# Patient Record
Sex: FEMALE | Race: WHITE | Employment: FULL TIME | URBAN - METROPOLITAN AREA
[De-identification: names, ages, dates, MRNs, and addresses within clinical notes are randomized per-mention and may not be internally consistent; named-entity substitution may affect disease eponyms.]

---

## 2024-08-27 ENCOUNTER — HOSPITAL ENCOUNTER (EMERGENCY)
Age: 29
Discharge: HOME | End: 2024-08-27
Attending: STUDENT IN AN ORGANIZED HEALTH CARE EDUCATION/TRAINING PROGRAM

## 2024-08-27 VITALS
BODY MASS INDEX: 31.05 KG/M2 | SYSTOLIC BLOOD PRESSURE: 116 MMHG | DIASTOLIC BLOOD PRESSURE: 72 MMHG | HEART RATE: 100 BPM | HEIGHT: 64 IN | RESPIRATION RATE: 18 BRPM | TEMPERATURE: 98.6 F | WEIGHT: 181.88 LBS | OXYGEN SATURATION: 98 %

## 2024-08-27 DIAGNOSIS — R10.30 LOWER ABDOMINAL PAIN: Primary | ICD-10-CM

## 2024-08-27 LAB
ALBUMIN SERPL-MCNC: 4.2 G/DL (ref 3.4–5)
ALBUMIN/GLOB SERPL: 1.2 {RATIO} (ref 1.1–2.2)
ALP SERPL-CCNC: 100 U/L (ref 40–129)
ALT SERPL-CCNC: 23 U/L (ref 10–40)
ANION GAP SERPL CALCULATED.3IONS-SCNC: 11 MMOL/L (ref 3–16)
AST SERPL-CCNC: 23 U/L (ref 15–37)
BASOPHILS # BLD: 0 K/UL (ref 0–0.2)
BASOPHILS NFR BLD: 0.4 %
BILIRUB SERPL-MCNC: <0.2 MG/DL (ref 0–1)
BILIRUB UR QL STRIP.AUTO: NEGATIVE
BUN SERPL-MCNC: 6 MG/DL (ref 7–20)
CALCIUM SERPL-MCNC: 8.9 MG/DL (ref 8.3–10.6)
CHLORIDE SERPL-SCNC: 103 MMOL/L (ref 99–110)
CLARITY UR: CLEAR
CO2 SERPL-SCNC: 24 MMOL/L (ref 21–32)
COLOR UR: YELLOW
CREAT SERPL-MCNC: 0.8 MG/DL (ref 0.6–1.1)
DEPRECATED RDW RBC AUTO: 12.8 % (ref 12.4–15.4)
EOSINOPHIL # BLD: 0.2 K/UL (ref 0–0.6)
EOSINOPHIL NFR BLD: 4.7 %
GFR SERPLBLD CREATININE-BSD FMLA CKD-EPI: >90 ML/MIN/{1.73_M2}
GLUCOSE SERPL-MCNC: 91 MG/DL (ref 70–99)
GLUCOSE UR STRIP.AUTO-MCNC: NEGATIVE MG/DL
HCG UR QL: NEGATIVE
HCT VFR BLD AUTO: 39.2 % (ref 36–48)
HGB BLD-MCNC: 12.9 G/DL (ref 12–16)
HGB UR QL STRIP.AUTO: NEGATIVE
KETONES UR STRIP.AUTO-MCNC: NEGATIVE MG/DL
LEUKOCYTE ESTERASE UR QL STRIP.AUTO: NEGATIVE
LIPASE SERPL-CCNC: 32 U/L (ref 13–60)
LYMPHOCYTES # BLD: 1.3 K/UL (ref 1–5.1)
LYMPHOCYTES NFR BLD: 26.7 %
MCH RBC QN AUTO: 30.6 PG (ref 26–34)
MCHC RBC AUTO-ENTMCNC: 32.8 G/DL (ref 31–36)
MCV RBC AUTO: 93.2 FL (ref 80–100)
MONOCYTES # BLD: 0.3 K/UL (ref 0–1.3)
MONOCYTES NFR BLD: 6.4 %
NEUTROPHILS # BLD: 3 K/UL (ref 1.7–7.7)
NEUTROPHILS NFR BLD: 61.8 %
NITRITE UR QL STRIP.AUTO: NEGATIVE
PH UR STRIP.AUTO: 8.5 [PH] (ref 5–8)
PLATELET # BLD AUTO: 246 K/UL (ref 135–450)
PMV BLD AUTO: 8.7 FL (ref 5–10.5)
POTASSIUM SERPL-SCNC: 4.4 MMOL/L (ref 3.5–5.1)
PROT SERPL-MCNC: 7.6 G/DL (ref 6.4–8.2)
PROT UR STRIP.AUTO-MCNC: NEGATIVE MG/DL
RBC # BLD AUTO: 4.21 M/UL (ref 4–5.2)
SODIUM SERPL-SCNC: 138 MMOL/L (ref 136–145)
SP GR UR STRIP.AUTO: 1.01 (ref 1–1.03)
UA COMPLETE W REFLEX CULTURE PNL UR: ABNORMAL
UA DIPSTICK W REFLEX MICRO PNL UR: ABNORMAL
URN SPEC COLLECT METH UR: ABNORMAL
UROBILINOGEN UR STRIP-ACNC: 0.2 E.U./DL
WBC # BLD AUTO: 4.8 K/UL (ref 4–11)

## 2024-08-27 PROCEDURE — 36415 COLL VENOUS BLD VENIPUNCTURE: CPT

## 2024-08-27 PROCEDURE — 84703 CHORIONIC GONADOTROPIN ASSAY: CPT

## 2024-08-27 PROCEDURE — 81003 URINALYSIS AUTO W/O SCOPE: CPT

## 2024-08-27 PROCEDURE — 85025 COMPLETE CBC W/AUTO DIFF WBC: CPT

## 2024-08-27 PROCEDURE — 99283 EMERGENCY DEPT VISIT LOW MDM: CPT

## 2024-08-27 PROCEDURE — 80053 COMPREHEN METABOLIC PANEL: CPT

## 2024-08-27 PROCEDURE — 83690 ASSAY OF LIPASE: CPT

## 2024-08-27 ASSESSMENT — PAIN DESCRIPTION - DESCRIPTORS: DESCRIPTORS: CRAMPING

## 2024-08-27 ASSESSMENT — PAIN SCALES - GENERAL: PAINLEVEL_OUTOF10: 3

## 2024-08-27 ASSESSMENT — PAIN - FUNCTIONAL ASSESSMENT: PAIN_FUNCTIONAL_ASSESSMENT: 0-10

## 2024-08-27 ASSESSMENT — PAIN DESCRIPTION - FREQUENCY: FREQUENCY: INTERMITTENT

## 2024-08-27 ASSESSMENT — PAIN DESCRIPTION - ORIENTATION: ORIENTATION: LOWER

## 2024-08-27 ASSESSMENT — PAIN DESCRIPTION - LOCATION: LOCATION: ABDOMEN

## 2024-08-27 NOTE — ED PROVIDER NOTES
ED Attending Attestation Note     Date of evaluation: 8/27/2024    This patient was seen by the resident.  I have seen and examined the patient, agree with the workup, evaluation, management and diagnosis. The care plan has been discussed.  My assessment reveals a well-appearing 28-year-old female presenting with intermittent abdominal cramping over the past week.  Patient reports nausea without emesis.  No dysuria, urinary frequency, hematuria, diarrhea or constipation, vaginal discharge or unusual bleeding.  On examination, she is well-appearing with no focal tenderness to palpation of the abdomen, no evidence of abdominal distention.     Nir Funez MD  08/27/24 [id number]

## 2024-08-27 NOTE — ED PROVIDER NOTES
THE [facility]  EMERGENCY DEPARTMENT ENCOUNTER          EM RESIDENT NOTE       Date of evaluation: 8/27/2024    Chief Complaint     Abdominal Pain and Nausea (Pt. Presents to ED with c/o lower abd pain/cramping and nausea for past week that worsened last night. )      History of Present Illness     Catrachita Veliz is a 28 y.o. female who presents with chief complaint of crampy lower abdominal pain for the past 1 to 2 weeks.  Patient states in the evenings most days she has been experiencing intestinal cramps across lower abdomen, worse in the evenings, occasionally associated with nausea but no emesis.  Patient denies diarrhea, constipation, blood in the stool.  Last night, the cramping was more intense than usual and not responsible to Pepto and Tylenol.  Patient presents today for evaluation.  Has been able to eat and drink normally, occasionally endorses slightly decreased of note, patient states her sister has been diagnosed with ulcerative colitis, though she herself has never had workup for UC.  Denies fevers, cough, urinary symptoms.  Last menstrual period was 2 to 3 weeks ago, patient on oral contraceptive.    MEDICAL DECISION MAKING / ASSESSMENT / PLAN     INITIAL VITALS: BP: 137/87, Temp: 98.6 °F (37 °C), Pulse: 100, Respirations: 18, SpO2: 98 %    Catrachita Veliz is a 28 y.o. female presenting with chief complaint of 1 to 2 weeks of intermittent cramping abdominal pain.  On arrival, patient is well-appearing in no acute distress, vital signs within normal limits.  Lab workup was overall reassuring, including CBC, CMP, lipase, urinalysis, hCG.  Specifically, patient did not have leukocytosis or findings on urinalysis concerning for urinary tract infection.  Low concern for appendicitis, gallbladder pathology, UTI.  As patient is asymptomatic here in the department and her lab work is reassuring, it is felt she is safe for discharge home.  She declined prescriptions for Bentyl or Zofran, but  °C), Pulse: 100, Respirations: 18, SpO2: 98 %   Physical Exam  Constitutional:       General: She is not in acute distress.     Appearance: She is well-developed. She is not ill-appearing.   HENT:      Head: Normocephalic and atraumatic.   Eyes:      Extraocular Movements: Extraocular movements intact.   Cardiovascular:      Rate and Rhythm: Normal rate and regular rhythm.      Heart sounds: Normal heart sounds. No murmur heard.  Pulmonary:      Effort: Pulmonary effort is normal.      Breath sounds: Normal breath sounds. No wheezing or rhonchi.   Abdominal:      General: Abdomen is flat. There is no distension.      Palpations: Abdomen is soft. There is no mass.      Tenderness: There is no abdominal tenderness. There is no right CVA tenderness, left CVA tenderness, guarding or rebound. Negative signs include McBurney's sign.   Skin:     General: Skin is warm and dry.      Capillary Refill: Capillary refill takes less than 2 seconds.   Neurological:      General: No focal deficit present.      Mental Status: She is alert and oriented to person, place, and time.                Annika Mark MD  Resident  08/27/24 [id number]

## 2024-08-27 NOTE — DISCHARGE INSTRUCTIONS
You were seen in the Emergency Department with abdominal pain. We performed labs that did not show any specific cause of your pain. Often, we cannot pinpoint exactly what is causing abdominal pain, but we do not think that anything dangerous or life-threatening is causing your symptoms today. Sometimes people come in with relatively mild symptoms that get worse. If this happens, you should return to see a doctor because dangerous problems like appendicitis and gallbladder issues can have normal labs early on before the symptoms get worse. Your symptoms should improve in the next 3-5 days.     You can take over-the-counter medicines like acetaminophen (Tylenol) and ibuprofen (Advil, Motrin, Aleve) as needed for pain, but do not take more than the recommended dose on the bottle. Drink plenty of fluids, at least 6-8 glasses of water per day. Be sure to eat plenty of fiber.     We recommend that you follow-up with your primary care provider in the next 7-10 days if you continue to experience symptoms. If you do not have a primary care provider, please contact your insurance provider to establish care with a provider or call [facility]'s central scheduling department at [phone] to schedule an appointment with a [facility] provider.       Reasons to return to the Emergency Department:  - You develop a fever (greater than 100.4°F)   - Your abdominal pain worsens or changes  - You are unable to keep down any food or fluids, even after taking nausea medications  - You find blood your vomit or have vomit that looks like coffee grounds  - You find blood in your stools or have dark black, tar-like stools  - You have any chest pain or difficulty breathing  - Or you have any other concerns    No future appointments.